# Patient Record
Sex: MALE | Race: WHITE | Employment: OTHER | URBAN - METROPOLITAN AREA
[De-identification: names, ages, dates, MRNs, and addresses within clinical notes are randomized per-mention and may not be internally consistent; named-entity substitution may affect disease eponyms.]

---

## 2017-02-07 ENCOUNTER — HOSPITAL ENCOUNTER (OUTPATIENT)
Dept: LAB | Age: 72
Discharge: HOME | End: 2017-02-07
Attending: INTERNAL MEDICINE
Payer: MEDICARE

## 2017-02-07 DIAGNOSIS — C73 PAPILLARY THYROID CARCINOMA (HCC): ICD-10-CM

## 2017-02-07 DIAGNOSIS — E89.0 POSTSURGICAL HYPOTHYROIDISM: ICD-10-CM

## 2017-02-07 LAB
T4 FREE SERPL-MCNC: 1.4 NG/DL (ref 0.78–1.46)
TSH SERPL DL<=0.005 MIU/L-ACNC: 1.97 UIU/ML (ref 0.36–3.74)

## 2017-02-07 PROCEDURE — 86800 THYROGLOBULIN ANTIBODY: CPT | Performed by: INTERNAL MEDICINE

## 2017-02-07 PROCEDURE — 84432 ASSAY OF THYROGLOBULIN: CPT | Performed by: INTERNAL MEDICINE

## 2017-02-07 PROCEDURE — 84439 ASSAY OF FREE THYROXINE: CPT | Performed by: INTERNAL MEDICINE

## 2017-02-07 PROCEDURE — 36415 COLL VENOUS BLD VENIPUNCTURE: CPT | Performed by: INTERNAL MEDICINE

## 2017-02-07 PROCEDURE — 84443 ASSAY THYROID STIM HORMONE: CPT | Performed by: INTERNAL MEDICINE

## 2017-02-08 LAB
THYROGLOB AB SERPL-ACNC: <1 IU/ML (ref 0–0.9)
THYROGLOBULIN, SERUM, 006694: 0.5 NG/ML (ref 1.4–29.2)

## 2017-08-08 ENCOUNTER — HOSPITAL ENCOUNTER (OUTPATIENT)
Dept: LAB | Age: 72
Discharge: HOME | End: 2017-08-08
Attending: INTERNAL MEDICINE
Payer: MEDICARE

## 2017-08-08 DIAGNOSIS — E89.0 POSTSURGICAL HYPOTHYROIDISM: ICD-10-CM

## 2017-08-08 DIAGNOSIS — E21.0 PRIMARY HYPERPARATHYROIDISM (HCC): ICD-10-CM

## 2017-08-08 LAB
ANION GAP BLD CALC-SCNC: 7 MMOL/L
BUN SERPL-MCNC: 20 MG/DL (ref 8–23)
CALCIUM SERPL-MCNC: 8.7 MG/DL (ref 8.3–10.4)
CHLORIDE SERPL-SCNC: 109 MMOL/L (ref 98–107)
CO2 SERPL-SCNC: 24 MMOL/L (ref 21–32)
CREAT SERPL-MCNC: 1.4 MG/DL (ref 0.8–1.5)
GLUCOSE SERPL-MCNC: 119 MG/DL (ref 65–100)
POTASSIUM SERPL-SCNC: 4.1 MMOL/L (ref 3.5–5.1)
SODIUM SERPL-SCNC: 140 MMOL/L (ref 136–145)
T4 FREE SERPL-MCNC: 1.4 NG/DL (ref 0.78–1.46)
TSH SERPL DL<=0.005 MIU/L-ACNC: 1.02 UIU/ML (ref 0.36–3.74)

## 2017-08-08 PROCEDURE — 36415 COLL VENOUS BLD VENIPUNCTURE: CPT | Performed by: INTERNAL MEDICINE

## 2017-08-08 PROCEDURE — 84439 ASSAY OF FREE THYROXINE: CPT | Performed by: INTERNAL MEDICINE

## 2017-08-08 PROCEDURE — 84443 ASSAY THYROID STIM HORMONE: CPT | Performed by: INTERNAL MEDICINE

## 2017-08-08 PROCEDURE — 80048 BASIC METABOLIC PNL TOTAL CA: CPT | Performed by: INTERNAL MEDICINE

## 2018-02-12 ENCOUNTER — HOSPITAL ENCOUNTER (OUTPATIENT)
Dept: LAB | Age: 73
Discharge: HOME | End: 2018-02-12
Payer: MEDICARE

## 2018-02-12 DIAGNOSIS — E89.0 POSTSURGICAL HYPOTHYROIDISM: ICD-10-CM

## 2018-02-12 DIAGNOSIS — E21.0 PRIMARY HYPERPARATHYROIDISM (HCC): ICD-10-CM

## 2018-02-12 DIAGNOSIS — C73 PAPILLARY THYROID CARCINOMA (HCC): ICD-10-CM

## 2018-02-12 LAB
ANION GAP SERPL CALC-SCNC: 8 MMOL/L (ref 7–16)
BUN SERPL-MCNC: 21 MG/DL (ref 8–23)
CALCIUM SERPL-MCNC: 8.7 MG/DL (ref 8.3–10.4)
CHLORIDE SERPL-SCNC: 107 MMOL/L (ref 98–107)
CO2 SERPL-SCNC: 28 MMOL/L (ref 21–32)
CREAT SERPL-MCNC: 1.5 MG/DL (ref 0.8–1.5)
GLUCOSE SERPL-MCNC: 88 MG/DL (ref 65–100)
POTASSIUM SERPL-SCNC: 4.9 MMOL/L (ref 3.5–5.1)
SODIUM SERPL-SCNC: 143 MMOL/L (ref 136–145)
T4 FREE SERPL-MCNC: 1.4 NG/DL (ref 0.78–1.46)
TSH SERPL DL<=0.005 MIU/L-ACNC: 0.94 UIU/ML (ref 0.36–3.74)

## 2018-02-12 PROCEDURE — 36415 COLL VENOUS BLD VENIPUNCTURE: CPT | Performed by: INTERNAL MEDICINE

## 2018-02-12 PROCEDURE — 86800 THYROGLOBULIN ANTIBODY: CPT | Performed by: INTERNAL MEDICINE

## 2018-02-12 PROCEDURE — 84432 ASSAY OF THYROGLOBULIN: CPT | Performed by: INTERNAL MEDICINE

## 2018-02-12 PROCEDURE — 80048 BASIC METABOLIC PNL TOTAL CA: CPT | Performed by: INTERNAL MEDICINE

## 2018-02-12 PROCEDURE — 84443 ASSAY THYROID STIM HORMONE: CPT | Performed by: INTERNAL MEDICINE

## 2018-02-12 PROCEDURE — 84439 ASSAY OF FREE THYROXINE: CPT | Performed by: INTERNAL MEDICINE

## 2018-02-13 LAB
THYROGLOB AB SERPL-ACNC: <1 IU/ML (ref 0–0.9)
THYROGLOBULIN, SERUM, 006694: 0.4 NG/ML (ref 1.4–29.2)

## 2018-08-08 ENCOUNTER — HOSPITAL ENCOUNTER (OUTPATIENT)
Dept: LAB | Age: 73
Discharge: HOME | End: 2018-08-08
Payer: MEDICARE

## 2018-08-08 DIAGNOSIS — E21.0 PRIMARY HYPERPARATHYROIDISM (HCC): ICD-10-CM

## 2018-08-08 DIAGNOSIS — E89.0 POSTSURGICAL HYPOTHYROIDISM: ICD-10-CM

## 2018-08-08 DIAGNOSIS — C73 PAPILLARY THYROID CARCINOMA (HCC): ICD-10-CM

## 2018-08-08 LAB
ANION GAP SERPL CALC-SCNC: 9 MMOL/L (ref 7–16)
BUN SERPL-MCNC: 22 MG/DL (ref 8–23)
CALCIUM SERPL-MCNC: 8.7 MG/DL (ref 8.3–10.4)
CHLORIDE SERPL-SCNC: 107 MMOL/L (ref 98–107)
CO2 SERPL-SCNC: 26 MMOL/L (ref 21–32)
CREAT SERPL-MCNC: 1.71 MG/DL (ref 0.8–1.5)
GLUCOSE SERPL-MCNC: 123 MG/DL (ref 65–100)
POTASSIUM SERPL-SCNC: 3.8 MMOL/L (ref 3.5–5.1)
SODIUM SERPL-SCNC: 142 MMOL/L (ref 136–145)
T4 FREE SERPL-MCNC: 1.3 NG/DL (ref 0.78–1.46)
TSH SERPL DL<=0.005 MIU/L-ACNC: 0.81 UIU/ML (ref 0.36–3.74)

## 2018-08-08 PROCEDURE — 80048 BASIC METABOLIC PNL TOTAL CA: CPT

## 2018-08-08 PROCEDURE — 84443 ASSAY THYROID STIM HORMONE: CPT

## 2018-08-08 PROCEDURE — 86800 THYROGLOBULIN ANTIBODY: CPT

## 2018-08-08 PROCEDURE — 84439 ASSAY OF FREE THYROXINE: CPT

## 2018-08-08 PROCEDURE — 84432 ASSAY OF THYROGLOBULIN: CPT

## 2018-08-08 PROCEDURE — 36415 COLL VENOUS BLD VENIPUNCTURE: CPT

## 2018-08-08 PROCEDURE — 82306 VITAMIN D 25 HYDROXY: CPT

## 2018-08-09 LAB
25(OH)D3+25(OH)D2 SERPL-MCNC: 39.2 NG/ML (ref 30–100)
THYROGLOB AB SERPL-ACNC: <1 IU/ML (ref 0–0.9)
THYROGLOBULIN, SERUM, 006694: 0.3 NG/ML (ref 1.4–29.2)

## 2019-08-08 ENCOUNTER — HOSPITAL ENCOUNTER (OUTPATIENT)
Dept: LAB | Age: 74
Discharge: HOME | End: 2019-08-08
Payer: MEDICARE

## 2019-08-08 DIAGNOSIS — C73 PAPILLARY THYROID CARCINOMA (HCC): ICD-10-CM

## 2019-08-08 DIAGNOSIS — N18.30 STAGE III CHRONIC KIDNEY DISEASE (HCC): ICD-10-CM

## 2019-08-08 DIAGNOSIS — E21.0 PRIMARY HYPERPARATHYROIDISM (HCC): ICD-10-CM

## 2019-08-08 DIAGNOSIS — E89.0 POSTSURGICAL HYPOTHYROIDISM: ICD-10-CM

## 2019-08-08 LAB
ANION GAP SERPL CALC-SCNC: 7 MMOL/L (ref 7–16)
BUN SERPL-MCNC: 24 MG/DL (ref 8–23)
CALCIUM SERPL-MCNC: 9.2 MG/DL (ref 8.3–10.4)
CHLORIDE SERPL-SCNC: 106 MMOL/L (ref 98–107)
CO2 SERPL-SCNC: 30 MMOL/L (ref 21–32)
CREAT SERPL-MCNC: 1.71 MG/DL (ref 0.8–1.5)
GLUCOSE SERPL-MCNC: 116 MG/DL (ref 65–100)
POTASSIUM SERPL-SCNC: 4.4 MMOL/L (ref 3.5–5.1)
SODIUM SERPL-SCNC: 143 MMOL/L (ref 136–145)
T4 FREE SERPL-MCNC: 1.2 NG/DL (ref 0.9–1.8)
TSH SERPL DL<=0.005 MIU/L-ACNC: 0.56 UIU/ML (ref 0.36–3.74)

## 2019-08-08 PROCEDURE — 84443 ASSAY THYROID STIM HORMONE: CPT

## 2019-08-08 PROCEDURE — 80048 BASIC METABOLIC PNL TOTAL CA: CPT

## 2019-08-08 PROCEDURE — 82306 VITAMIN D 25 HYDROXY: CPT

## 2019-08-08 PROCEDURE — 84439 ASSAY OF FREE THYROXINE: CPT

## 2019-08-08 PROCEDURE — 84432 ASSAY OF THYROGLOBULIN: CPT

## 2019-08-08 PROCEDURE — 36415 COLL VENOUS BLD VENIPUNCTURE: CPT

## 2019-08-08 PROCEDURE — 86800 THYROGLOBULIN ANTIBODY: CPT

## 2019-08-09 LAB
25(OH)D3+25(OH)D2 SERPL-MCNC: 45.2 NG/ML (ref 30–100)
THYROGLOB AB SERPL-ACNC: <1 IU/ML (ref 0–0.9)
THYROGLOBULIN, SERUM, 006694: 0.2 NG/ML (ref 1.4–29.2)

## 2020-08-17 ENCOUNTER — HOSPITAL ENCOUNTER (OUTPATIENT)
Dept: LAB | Age: 75
Discharge: HOME | End: 2020-08-17
Payer: MEDICARE

## 2020-08-17 LAB
T4 FREE SERPL-MCNC: 1.5 NG/DL (ref 0.78–1.46)
TSH SERPL DL<=0.005 MIU/L-ACNC: 0.72 UIU/ML (ref 0.36–3.74)

## 2020-08-17 PROCEDURE — 36415 COLL VENOUS BLD VENIPUNCTURE: CPT

## 2020-08-17 PROCEDURE — 86800 THYROGLOBULIN ANTIBODY: CPT

## 2020-08-17 PROCEDURE — 84432 ASSAY OF THYROGLOBULIN: CPT

## 2020-08-17 PROCEDURE — 84439 ASSAY OF FREE THYROXINE: CPT

## 2020-08-17 PROCEDURE — 84443 ASSAY THYROID STIM HORMONE: CPT

## 2020-08-18 LAB
THYROGLOB AB SERPL-ACNC: <1 IU/ML (ref 0–0.9)
THYROGLOBULIN, SERUM, 006694: 0.3 NG/ML (ref 1.4–29.2)

## 2021-08-19 ENCOUNTER — HOSPITAL ENCOUNTER (OUTPATIENT)
Dept: LAB | Age: 76
Discharge: HOME | End: 2021-08-19
Payer: MEDICARE

## 2021-08-19 DIAGNOSIS — E89.0 POSTSURGICAL HYPOTHYROIDISM: ICD-10-CM

## 2021-08-19 DIAGNOSIS — E21.0 PRIMARY HYPERPARATHYROIDISM (HCC): ICD-10-CM

## 2021-08-19 DIAGNOSIS — R97.20 ELEVATED PSA: ICD-10-CM

## 2021-08-19 DIAGNOSIS — C73 PAPILLARY THYROID CARCINOMA (HCC): ICD-10-CM

## 2021-08-19 LAB
ALBUMIN SERPL-MCNC: 3.7 G/DL (ref 3.2–4.6)
ALBUMIN/GLOB SERPL: 1.2 {RATIO} (ref 1.2–3.5)
ALP SERPL-CCNC: 75 U/L (ref 50–136)
ALT SERPL-CCNC: 34 U/L (ref 12–65)
ANION GAP SERPL CALC-SCNC: 5 MMOL/L (ref 7–16)
AST SERPL-CCNC: 28 U/L (ref 15–37)
BILIRUB SERPL-MCNC: 0.4 MG/DL (ref 0.2–1.1)
BUN SERPL-MCNC: 25 MG/DL (ref 8–23)
CALCIUM SERPL-MCNC: 9.1 MG/DL (ref 8.3–10.4)
CHLORIDE SERPL-SCNC: 109 MMOL/L (ref 98–107)
CO2 SERPL-SCNC: 28 MMOL/L (ref 21–32)
CREAT SERPL-MCNC: 1.71 MG/DL (ref 0.8–1.5)
GLOBULIN SER CALC-MCNC: 3.2 G/DL (ref 2.3–3.5)
GLUCOSE SERPL-MCNC: 60 MG/DL (ref 65–100)
POTASSIUM SERPL-SCNC: 4.6 MMOL/L (ref 3.5–5.1)
PROT SERPL-MCNC: 6.9 G/DL (ref 6.3–8.2)
PSA SERPL-MCNC: 5.6 NG/ML
SODIUM SERPL-SCNC: 142 MMOL/L (ref 138–145)
T4 FREE SERPL-MCNC: 1.3 NG/DL (ref 0.78–1.46)
TSH SERPL DL<=0.005 MIU/L-ACNC: 0.47 UIU/ML (ref 0.36–3.74)

## 2021-08-19 PROCEDURE — 86800 THYROGLOBULIN ANTIBODY: CPT

## 2021-08-19 PROCEDURE — 84443 ASSAY THYROID STIM HORMONE: CPT

## 2021-08-19 PROCEDURE — 36415 COLL VENOUS BLD VENIPUNCTURE: CPT

## 2021-08-19 PROCEDURE — 84439 ASSAY OF FREE THYROXINE: CPT

## 2021-08-19 PROCEDURE — 80053 COMPREHEN METABOLIC PANEL: CPT

## 2021-08-19 PROCEDURE — 84153 ASSAY OF PSA TOTAL: CPT

## 2021-08-19 PROCEDURE — 84432 ASSAY OF THYROGLOBULIN: CPT

## 2022-05-27 DIAGNOSIS — C73 PAPILLARY THYROID CARCINOMA (HCC): ICD-10-CM

## 2022-05-27 DIAGNOSIS — E89.0 POSTSURGICAL HYPOTHYROIDISM: Primary | ICD-10-CM

## 2022-05-27 DIAGNOSIS — E21.0 PRIMARY HYPERPARATHYROIDISM (HCC): ICD-10-CM

## 2022-07-11 DIAGNOSIS — R97.20 ELEVATED PSA: Primary | ICD-10-CM

## 2022-07-13 ENCOUNTER — NURSE ONLY (OUTPATIENT)
Dept: UROLOGY | Age: 77
End: 2022-07-13

## 2022-07-13 DIAGNOSIS — R97.20 ELEVATED PSA: ICD-10-CM

## 2022-07-13 LAB — PSA SERPL-MCNC: 6.3 NG/ML

## 2022-07-20 ENCOUNTER — OFFICE VISIT (OUTPATIENT)
Dept: UROLOGY | Age: 77
End: 2022-07-20
Payer: MEDICARE

## 2022-07-20 DIAGNOSIS — R97.20 ELEVATED PSA: Primary | ICD-10-CM

## 2022-07-20 LAB
BILIRUBIN, URINE, POC: NEGATIVE
BLOOD URINE, POC: NEGATIVE
GLUCOSE URINE, POC: NEGATIVE
KETONES, URINE, POC: NEGATIVE
LEUKOCYTE ESTERASE, URINE, POC: NORMAL
NITRITE, URINE, POC: NEGATIVE
PH, URINE, POC: 5.5 (ref 4.6–8)
PROTEIN,URINE, POC: NEGATIVE
SPECIFIC GRAVITY, URINE, POC: 1.02 (ref 1–1.03)
URINALYSIS CLARITY, POC: NORMAL
URINALYSIS COLOR, POC: NORMAL
UROBILINOGEN, POC: NORMAL

## 2022-07-20 PROCEDURE — 1036F TOBACCO NON-USER: CPT | Performed by: UROLOGY

## 2022-07-20 PROCEDURE — 99213 OFFICE O/P EST LOW 20 MIN: CPT | Performed by: UROLOGY

## 2022-07-20 PROCEDURE — 1123F ACP DISCUSS/DSCN MKR DOCD: CPT | Performed by: UROLOGY

## 2022-07-20 PROCEDURE — G8420 CALC BMI NORM PARAMETERS: HCPCS | Performed by: UROLOGY

## 2022-07-20 PROCEDURE — G8427 DOCREV CUR MEDS BY ELIG CLIN: HCPCS | Performed by: UROLOGY

## 2022-07-20 PROCEDURE — 81003 URINALYSIS AUTO W/O SCOPE: CPT | Performed by: UROLOGY

## 2022-07-20 RX ORDER — AMIODARONE HYDROCHLORIDE 200 MG/1
100 TABLET ORAL DAILY
COMMUNITY
Start: 2021-12-07

## 2022-07-20 RX ORDER — HYDROXYZINE HYDROCHLORIDE 10 MG/1
TABLET, FILM COATED ORAL
COMMUNITY
Start: 2022-06-24

## 2022-07-20 NOTE — PROGRESS NOTES
[facility]  [place], [address] [facility]  [phone]    Jonathan Moyer  : 1945     HPI   68 y.o., male returns in follow up for an elevated PSA. PSA was 5.8 (29% free) on 2017; 5.0 on 2017; 5.5 on 10/20/18; 4.9 (35% free) on 19; 5.4 on 19; 5.2 on ; 6.0 on 6/15/21 and is now 6.3 on 22. Previously seen by Dr. Amanda Oconnell. No prior bx or FH of CaP. Taking eliquis for a fib. Reports stable nocturia times one. Past Medical History:   Diagnosis Date    Atrial fibrillation ([address]     COVID-19 2021    Hyperlipidemia     Papillary thyroid carcinoma (HCC)     1.2 cm and 0.25 cm x 2 in the left lobe status post thyroidectomy without lymph node dissection 3/25/2015 (no RAJAN)    Postsurgical hypothyroidism     Primary hyperparathyroidism ([address]     Status post parathyroidectomy 3/27/2015    Stage III chronic kidney disease [facility])      Past Surgical History:   Procedure Laterality Date    ADENOIDECTOMY  Age 8    HEENT  2004    Mouth surgery    HERNIA REPAIR  1998    ORTHOPEDIC SURGERY  2018    right hand Duputren's contracture repair    PARATHYROIDECTOMY  2015    Dr. Baljit Thornton Bilateral 2015    Dr. Frankie Sherwood  Age 4     Current Outpatient Medications   Medication Sig Dispense Refill    amiodarone (CORDARONE) 200 MG tablet 200 mg      hydrOXYzine HCl (ATARAX) 10 MG tablet TAKE ONE TABLET BY MOUTH THREE TIMES DAILY AS NEEDED ITCHING      apixaban (ELIQUIS) 5 MG TABS tablet Take 5 mg by mouth 2 times daily      levothyroxine (SYNTHROID) 125 MCG tablet Take 125 mcg by mouth every morning (before breakfast)      lipase-protease-amylase (CREON) 6000-96305 units delayed release capsule Take by mouth 3 times daily (with meals)      simvastatin (ZOCOR) 10 MG tablet Take 10 mg by mouth       No current facility-administered medications for this visit.      Not on File  Social History     Socioeconomic History    Marital status:      Spouse name: Not on file    Number of children: Not on file    Years of education: Not on file    Highest education level: Not on file   Occupational History    Not on file   Tobacco Use    Smoking status: Never    Smokeless tobacco: Never   Substance and Sexual Activity    Alcohol use: No    Drug use: Not on file    Sexual activity: Not on file   Other Topics Concern    Not on file   Social History Narrative    Not on file     Social Determinants of Health     Financial Resource Strain: Not on file   Food Insecurity: Not on file   Transportation Needs: Not on file   Physical Activity: Not on file   Stress: Not on file   Social Connections: Not on file   Intimate Partner Violence: Not on file   Housing Stability: Not on file     Family History   Problem Relation Age of Onset    Heart Disease Mother     Heart Disease Father     Hypertension Father     Thyroid Cancer Neg Hx     Breast Cancer Maternal Aunt        Review of Systems  All systems reviewed and are negative at this time. Physical Exam  There were no vitals taken for this visit.   General appearance - alert, well appearing, and in no distress  Mental status - alert, oriented to person, place, and time  Eyes - extraocular eye movements intact, sclera anicteric  MARYAM- anodular prostate  Neurological -  normal speech, no focal findings or movement disorder noted  Skin - normal coloration and turgor        Urinalysis  UA - Dipstick  Results for orders placed or performed in visit on 07/20/22   AMB POC URINALYSIS DIP STICK AUTO W/O MICRO   Result Value Ref Range    Color (UA POC)      Clarity (UA POC)      Glucose, Urine, POC Negative Negative    Bilirubin, Urine, POC Negative Negative    KETONES, Urine, POC Negative Negative    Specific Gravity, Urine, POC 1.020 1.001 - 1.035    Blood (UA POC) Negative Negative    pH, Urine, POC 5.5 4.6 - 8.0    Protein, Urine, POC Negative Negative    Urobilinogen, POC 0.2 mg/dL     Nitrite, Urine, POC Negative Negative    Leukocyte Esterase, Urine, POC Trace Negative       UA - Micro  WBC - 0  RBC - 0  Bacteria - 0  Epith - 0    Assessment/Plan    ICD-10-CM    1. Elevated PSA  R97.20 AMB POC URINALYSIS DIP STICK AUTO W/O MICRO     PSA, Diagnostic        Pt elected for cont observation. RTO in 12 mo with PSA prior.     WILLI TOUSSAINT, DO

## 2022-08-16 DIAGNOSIS — E89.0 POSTSURGICAL HYPOTHYROIDISM: ICD-10-CM

## 2022-08-16 DIAGNOSIS — C73 PAPILLARY THYROID CARCINOMA (HCC): ICD-10-CM

## 2022-08-16 DIAGNOSIS — E21.0 PRIMARY HYPERPARATHYROIDISM (HCC): ICD-10-CM

## 2022-08-16 LAB
ALBUMIN SERPL-MCNC: 3.7 G/DL (ref 3.2–4.6)
ALBUMIN/GLOB SERPL: 1.2 {RATIO} (ref 1.2–3.5)
ALP SERPL-CCNC: 70 U/L (ref 50–136)
ALT SERPL-CCNC: 27 U/L (ref 12–65)
ANION GAP SERPL CALC-SCNC: 2 MMOL/L (ref 7–16)
AST SERPL-CCNC: 22 U/L (ref 15–37)
BILIRUB SERPL-MCNC: 0.6 MG/DL (ref 0.2–1.1)
BUN SERPL-MCNC: 24 MG/DL (ref 8–23)
CALCIUM SERPL-MCNC: 8.7 MG/DL (ref 8.3–10.4)
CHLORIDE SERPL-SCNC: 108 MMOL/L (ref 98–107)
CO2 SERPL-SCNC: 28 MMOL/L (ref 21–32)
CREAT SERPL-MCNC: 1.8 MG/DL (ref 0.8–1.5)
GLOBULIN SER CALC-MCNC: 3 G/DL (ref 2.3–3.5)
GLUCOSE SERPL-MCNC: 85 MG/DL (ref 65–100)
POTASSIUM SERPL-SCNC: 5.4 MMOL/L (ref 3.5–5.1)
PROT SERPL-MCNC: 6.7 G/DL (ref 6.3–8.2)
SODIUM SERPL-SCNC: 138 MMOL/L (ref 138–145)
T4 FREE SERPL-MCNC: 1.7 NG/DL (ref 0.78–1.46)
TSH, 3RD GENERATION: 0.13 UIU/ML (ref 0.36–3.74)

## 2022-08-17 LAB
THYROGLOB AB SERPL-ACNC: <1 IU/ML (ref 0–0.9)
THYROGLOBULIN: 0.3 NG/ML (ref 1.4–29.2)

## 2022-08-23 ENCOUNTER — OFFICE VISIT (OUTPATIENT)
Dept: ENDOCRINOLOGY | Age: 77
End: 2022-08-23
Payer: MEDICARE

## 2022-08-23 VITALS — BODY MASS INDEX: 20.5 KG/M2 | WEIGHT: 147 LBS | SYSTOLIC BLOOD PRESSURE: 160 MMHG | DIASTOLIC BLOOD PRESSURE: 96 MMHG

## 2022-08-23 DIAGNOSIS — I48.0 PAROXYSMAL ATRIAL FIBRILLATION (HCC): ICD-10-CM

## 2022-08-23 DIAGNOSIS — C73 PAPILLARY THYROID CARCINOMA (HCC): Primary | ICD-10-CM

## 2022-08-23 DIAGNOSIS — E89.0 POSTSURGICAL HYPOTHYROIDISM: ICD-10-CM

## 2022-08-23 DIAGNOSIS — Z86.39 HISTORY OF PRIMARY HYPERPARATHYROIDISM: ICD-10-CM

## 2022-08-23 PROCEDURE — 1036F TOBACCO NON-USER: CPT | Performed by: INTERNAL MEDICINE

## 2022-08-23 PROCEDURE — G8420 CALC BMI NORM PARAMETERS: HCPCS | Performed by: INTERNAL MEDICINE

## 2022-08-23 PROCEDURE — G8428 CUR MEDS NOT DOCUMENT: HCPCS | Performed by: INTERNAL MEDICINE

## 2022-08-23 PROCEDURE — 99214 OFFICE O/P EST MOD 30 MIN: CPT | Performed by: INTERNAL MEDICINE

## 2022-08-23 PROCEDURE — 1123F ACP DISCUSS/DSCN MKR DOCD: CPT | Performed by: INTERNAL MEDICINE

## 2022-08-23 RX ORDER — ZINC SULFATE 50(220)MG
50 CAPSULE ORAL DAILY
COMMUNITY

## 2022-08-23 RX ORDER — LEVOTHYROXINE SODIUM 112 UG/1
112 TABLET ORAL
Qty: 90 TABLET | Refills: 3 | Status: SHIPPED | OUTPATIENT
Start: 2022-08-23

## 2022-08-23 ASSESSMENT — ENCOUNTER SYMPTOMS
CONSTIPATION: 0
DIARRHEA: 0

## 2022-08-23 NOTE — PROGRESS NOTES
Liza Oconnor MD, [address]            Reason for visit: Follow-up of thyroid cancer, hypothyroidism, and hyperparathyroidism      ASSESSMENT AND PLAN:    1. Papillary thyroid carcinoma ([facility]  He is status post thyroidectomy 3/27/2015 for a multifocal (largest tumor 1.2 cm) papillary carcinoma. He has not received radioactive iodine, in order I recommended. All testing has been suggestive of cure. Going forward, he can be monitored with yearly thyroglobulin and ultrasound every few years (most recent ultrasound was 2020). - Thyroglobulin Panel; Future    2. Postsurgical hypothyroidism  Because of comorbid paroxysmal atrial fibrillation, I recommend against suppression of TSH. He is currently overtreated. I will reduce his levothyroxine dose slightly as below. He will recheck thyroid function in 2 months and then again prior to the next appointment. - levothyroxine (SYNTHROID) 112 MCG tablet; Take 1 tablet by mouth every morning (before breakfast)  Dispense: 90 tablet; Refill: 3  - TSH; Future  - T4, Free; Future  - TSH; Future  - T4, Free; Future    3. History of primary hyperparathyroidism  Calcium remains normal following parathyroidectomy 3/27/2015. 4. Paroxysmal atrial fibrillation ([facility]      Follow-up and Dispositions    Return in 1 year (on 8/23/2023). History of Present Illness:    HYPOTHYROIDISM  Demetrius Bansal presents to the office for follow up of papillary thyroid carcinoma and postsurgical hypothyroidism. Presentation of hypothyroidism: He underwent thyroidectomy at the time of parathyroidectomy with Dr. Nelly Patton 3/27/2015. Treatment status: He was started on Synthroid 100 mcg daily 3/27/2015. His dose was increased to 112 mcg daily 4/27/2015 and to 125 mcg daily 2/15/2016.     Thyroid labs:  1/16/2015: TSH 1.200, free T4 1.01.   4/24/2015: TSH 4.689, free T4 1.2.  8/19/2015: TSH 3.336.  2/8/2016: TSH 4.54, free T4 1.3.  3/28/2016: TSH 2.761, free T4 1.38.    2017: TSH 1.970, free T4 1.4.  2017: TSH 1.020, free T4 1.4.  2018: TSH 0.943, free T4 1.4.  2018: TSH 0.806, free T4 1.3.  2019: TSH 0.561, free T4 1.2.  2020: TSH 0.719, free T4 1.5.  2021: TSH 0.473, free T4 1.3.  10/23/2021: TSH 0.442.  10/29/2021: TSH 2.200, free T4 1.29.  2021: TSH 2.043, free T4 1.62.  2022: TSH 0.312, free T4 1.59.  2022: TSH 0.130, free T4 1.7. Symptoms: See review of systems below       THYROID CANCER   Prior treatment:   3/27/2015: Thyroidectomy without lymph node dissection ([facility])- 1.2 cm papillary thyroid carcinoma (classic architecture), 0.25 cm papillary thyroid carcinoma (classic architecture) x 2 in the left lobe. No RAJAN. Labs:  2015: Thyroglobulin 1.11, thyroglobulin antibodies less than 1.  2015: Thyroglobulin 0.68, thyroglobulin antibodies less than 1.  3:Thyroglobulin 0.5, thyroglobulin antibodies less than 1.    2017: Thyroglobulin 0.5, thyroglobulin antibodies less than 1.0.  2018: Thyroglobulin 0.4, thyroglobulin antibodies less than 1.0.  2018: Thyroglobulin 0.3, thyroglobulin antibodies less than 1.0.  2019: Thyroglobulin 0.2, thyroglobulin antibodies less than 1.0.  2020: Thyroglobulin 0.3, thyroglobulin antibodies less than 1.0.  2021: Thyroglobulin 0.3, thyroglobulin antibodies less than 1.0.  2022: Thyroglobulin 0.3, thyroglobulin antibodies less than 1.0. Imagin/15/2016: Head and neck ultrasound ([facility])- no evidence for local recurrence. 2020: Head and neck ultrasound ([facility])- no evidence for local recurrence. HYPERCALCEMIA  Presentation of hypercalcemia: Hypercalcemia was incidentally discovered ~. Subsequent evaluation revealed the presence of primary hyperparathyroidism.      Labs:   2015: Calcium 10.6, ionized calcium 5.7 (reference 4.8-5.6), creatinine 1.4, intact parathyroid hormone 203.1, 25 hydroxy vitamin D 46.4.   4/24/2015: Calcium 9.8, creatinine 1.48.  8/19/2015: Calcium 9.7, creatinine 1.63.  3/28/2016: Calcium 8.9, creatinine 1.41.  7/11/2016: Ionized calcium 4.6.  8/8/2017: Calcium 8.7, creatinine 1.40.  2/12/2018: Calcium 8.7, creatinine 1.50.  8/8/2018: Calcium 8.7, creatinine 1.71, 25 hydroxy vitamin D 39.2.  8/8/2019: Calcium 9.2, creatinine 1.71, 25-hydroxy vitamin D 45.2.  8/19/2021: Calcium 9.1, creatinine 1.71.  8/16/2022: Calcium 8.7, creatinine 1.80. Prior treatment: Parathyroidectomy 3/27/2015. Review of Systems   Constitutional:  Positive for unexpected weight change (lost 25 pounds in the last 2+ years (8 pounds in the last year)). Negative for fatigue. Cardiovascular:  Negative for palpitations. Gastrointestinal:  Negative for constipation and diarrhea. Psychiatric/Behavioral:  Negative for sleep disturbance. BP (!) 160/96 (Site: Left Upper Arm, Position: Sitting)   Wt 147 lb (66.7 kg)   BMI 20.50 kg/m²   Wt Readings from Last 3 Encounters:   08/23/22 147 lb (66.7 kg)   08/23/21 155 lb (70.3 kg)       Physical Exam  Constitutional:       Appearance: Normal appearance. HENT:      Head: Normocephalic. Neck:      Thyroid: No thyroid mass or thyromegaly. Comments: Healed thyroidectomy scar. No palpable neck masses. Cardiovascular:      Rate and Rhythm: Normal rate and regular rhythm. Pulmonary:      Effort: Pulmonary effort is normal.      Breath sounds: Normal breath sounds. Neurological:      Mental Status: He is alert.    Psychiatric:         Mood and Affect: Mood normal.         Behavior: Behavior normal.       Orders Placed This Encounter   Procedures    TSH     Standing Status:   Future     Standing Expiration Date:   8/23/2024    T4, Free     Standing Status:   Future     Standing Expiration Date:   8/23/2024    TSH     Standing Status:   Future     Standing Expiration Date:   8/23/2024    T4, Free     Standing Status: Future     Standing Expiration Date:   8/23/2024    Thyroglobulin Panel     Standing Status:   Future     Standing Expiration Date:   8/23/2024         Current Outpatient Medications   Medication Sig Dispense Refill    zinc sulfate (ZINCATE) 220 (50 Zn) MG capsule Take 50 mg by mouth daily      levothyroxine (SYNTHROID) 112 MCG tablet Take 1 tablet by mouth every morning (before breakfast) 90 tablet 3    amiodarone (CORDARONE) 200 MG tablet Take 100 mg by mouth daily      hydrOXYzine HCl (ATARAX) 10 MG tablet TAKE ONE TABLET BY MOUTH THREE TIMES DAILY AS NEEDED ITCHING      apixaban (ELIQUIS) 5 MG TABS tablet Take 5 mg by mouth 2 times daily      lipase-protease-amylase (CREON) 6000-36932 units delayed release capsule Take by mouth 3 times daily (with meals)      simvastatin (ZOCOR) 10 MG tablet Take 10 mg by mouth       No current facility-administered medications for this visit. Radha Haro MD, FACE      Portions of this note were generated with the assistance of voice recognition software. As such, some errors in transcription may be present.

## 2022-09-02 DIAGNOSIS — E89.0 POSTPROCEDURAL HYPOTHYROIDISM: ICD-10-CM

## 2022-09-02 RX ORDER — LEVOTHYROXINE SODIUM 0.12 MG/1
TABLET ORAL
Qty: 90 TABLET | Refills: 3 | OUTPATIENT
Start: 2022-09-02

## 2022-10-21 DIAGNOSIS — E89.0 POSTSURGICAL HYPOTHYROIDISM: ICD-10-CM

## 2022-10-22 LAB
T4 FREE SERPL-MCNC: 1.7 NG/DL (ref 0.78–1.46)
TSH, 3RD GENERATION: 1.64 UIU/ML (ref 0.36–3.74)

## 2023-07-13 ENCOUNTER — NURSE ONLY (OUTPATIENT)
Dept: UROLOGY | Age: 78
End: 2023-07-13

## 2023-07-13 DIAGNOSIS — R97.20 ELEVATED PSA: ICD-10-CM

## 2023-07-13 LAB — PSA SERPL-MCNC: 6.1 NG/ML

## 2023-07-20 ENCOUNTER — OFFICE VISIT (OUTPATIENT)
Dept: UROLOGY | Age: 78
End: 2023-07-20
Payer: MEDICARE

## 2023-07-20 DIAGNOSIS — N40.1 BENIGN PROSTATIC HYPERPLASIA WITH LOWER URINARY TRACT SYMPTOMS, SYMPTOM DETAILS UNSPECIFIED: ICD-10-CM

## 2023-07-20 DIAGNOSIS — R97.20 ELEVATED PSA: Primary | ICD-10-CM

## 2023-07-20 PROCEDURE — 99214 OFFICE O/P EST MOD 30 MIN: CPT | Performed by: UROLOGY

## 2023-07-20 PROCEDURE — 1123F ACP DISCUSS/DSCN MKR DOCD: CPT | Performed by: UROLOGY

## 2023-07-20 NOTE — PROGRESS NOTES
[facility]  [address] [address]  [phone]    Pillo Estrella  : 1945     HPI   66 y.o., male returns in follow up for an elevated PSA. PSA was 5.8 (29% free) on 2017; 5.0 on 2017; 5.5 on 10/20/18; 4.9 (35% free) on 19; 5.4 on 19; 5.2 on ; 6.0 on 6/15/21; 6.3 on 22 and is now 6.1 on 23. Previously seen by Dr. Ivy Segundo. No prior bx or FH of CaP. Taking eliquis for a fib. Reports stable nocturia times one.       Past Medical History:   Diagnosis Date    COVID-19 2021    Hyperlipidemia     Papillary thyroid carcinoma (HCC)     1.2 cm and 0.25 cm x 2 in the left lobe status post thyroidectomy without lymph node dissection 3/25/2015 (no RAJAN)    Paroxysmal atrial fibrillation (HCC)     Postsurgical hypothyroidism     Primary hyperparathyroidism ([address])     Status post parathyroidectomy 3/27/2015    Stage III chronic kidney disease [facility])      Past Surgical History:   Procedure Laterality Date    ADENOIDECTOMY  Age 8    HEENT  2004    Mouth surgery    HERNIA REPAIR  1998    ORTHOPEDIC SURGERY  2018    right hand Duputren's contracture repair    PARATHYROIDECTOMY  2015    Dr. Rice Hanks Bilateral 2015    Dr. Jovany Orta  Age 4     Current Outpatient Medications   Medication Sig Dispense Refill    zinc sulfate (ZINCATE) 220 (50 Zn) MG capsule Take 50 mg by mouth daily      levothyroxine (SYNTHROID) 112 MCG tablet Take 1 tablet by mouth every morning (before breakfast) 90 tablet 3    amiodarone (CORDARONE) 200 MG tablet Take 100 mg by mouth daily      hydrOXYzine HCl (ATARAX) 10 MG tablet TAKE ONE TABLET BY MOUTH THREE TIMES DAILY AS NEEDED ITCHING      apixaban (ELIQUIS) 5 MG TABS tablet Take 5 mg by mouth 2 times daily      lipase-protease-amylase (CREON) 6000-08773 units delayed release capsule Take by mouth 3 times daily (with meals)      simvastatin (ZOCOR) 10 MG tablet Take 10 mg by mouth       No

## 2024-07-18 ENCOUNTER — LAB (OUTPATIENT)
Dept: UROLOGY | Age: 79
End: 2024-07-18

## 2024-07-18 DIAGNOSIS — R97.20 ELEVATED PSA: ICD-10-CM

## 2024-07-18 LAB — PSA SERPL-MCNC: 6.5 NG/ML (ref 0–4)

## 2024-07-22 ENCOUNTER — OFFICE VISIT (OUTPATIENT)
Dept: UROLOGY | Age: 79
End: 2024-07-22
Payer: MEDICARE

## 2024-07-22 DIAGNOSIS — R97.20 ELEVATED PSA: Primary | ICD-10-CM

## 2024-07-22 DIAGNOSIS — N40.1 BENIGN PROSTATIC HYPERPLASIA WITH LOWER URINARY TRACT SYMPTOMS, SYMPTOM DETAILS UNSPECIFIED: ICD-10-CM

## 2024-07-22 LAB
BILIRUBIN, URINE, POC: NEGATIVE
BLOOD URINE, POC: NEGATIVE
GLUCOSE URINE, POC: NEGATIVE
KETONES, URINE, POC: NEGATIVE
LEUKOCYTE ESTERASE, URINE, POC: NORMAL
NITRITE, URINE, POC: NEGATIVE
PH, URINE, POC: 6 (ref 4.6–8)
PROTEIN,URINE, POC: NEGATIVE
SPECIFIC GRAVITY, URINE, POC: 1.01 (ref 1–1.03)
URINALYSIS CLARITY, POC: NORMAL
URINALYSIS COLOR, POC: NORMAL
UROBILINOGEN, POC: NORMAL

## 2024-07-22 PROCEDURE — 1123F ACP DISCUSS/DSCN MKR DOCD: CPT | Performed by: UROLOGY

## 2024-07-22 PROCEDURE — 81003 URINALYSIS AUTO W/O SCOPE: CPT | Performed by: UROLOGY

## 2024-07-22 PROCEDURE — 99213 OFFICE O/P EST LOW 20 MIN: CPT | Performed by: UROLOGY

## 2024-07-22 PROCEDURE — G8427 DOCREV CUR MEDS BY ELIG CLIN: HCPCS | Performed by: UROLOGY

## 2024-07-22 PROCEDURE — G8421 BMI NOT CALCULATED: HCPCS | Performed by: UROLOGY

## 2024-07-22 PROCEDURE — 1036F TOBACCO NON-USER: CPT | Performed by: UROLOGY

## 2024-07-22 NOTE — PROGRESS NOTES
[facility]  [address] [place] [address]  [phone]    Adam Cadena  : 1945     HPI   79 y.o., male returns in follow up for an elevated PSA.  PSA was 5.8 (29% free) on 2017; 5.0 on 2017; 5.5 on 10/20/18; 4.9 (35% free) on 19; 5.4 on 19; 5.2 on ; 6.0 on 6/15/21; 6.3 on 22; 6.1 on 23 and is now 6.5 on 24.  Previously seen by Dr. Wheat.  No prior bx or FH of CaP.  Taking eliquis for a fib.  Reports stable nocturia 1-2x per night..       Past Medical History:   Diagnosis Date    COVID-2021    Hyperlipidemia     Papillary thyroid carcinoma (HCC)     1.2 cm and 0.25 cm x 2 in the left lobe status post thyroidectomy without lymph node dissection 3/25/2015 (no RAJAN)    Paroxysmal atrial fibrillation (HCC)     Postsurgical hypothyroidism     Primary hyperparathyroidism (HCC)     Status post parathyroidectomy 3/27/2015    Stage III chronic kidney disease (HCC)      Past Surgical History:   Procedure Laterality Date    ADENOIDECTOMY  Age 8    HEENT  2004    Mouth surgery    HERNIA REPAIR  1998    ORTHOPEDIC SURGERY  2018    right hand Duputren's contracture repair    PARATHYROIDECTOMY  2015    Dr. Michelle    THYROIDECTOMY Bilateral 2015    Dr. Michelle    TONSILLECTOMY  Age 4     Current Outpatient Medications   Medication Sig Dispense Refill    zinc sulfate (ZINCATE) 220 (50 Zn) MG capsule Take 1 capsule by mouth daily      levothyroxine (SYNTHROID) 112 MCG tablet Take 1 tablet by mouth every morning (before breakfast) 90 tablet 3    amiodarone (CORDARONE) 200 MG tablet Take 0.5 tablets by mouth daily      hydrOXYzine HCl (ATARAX) 10 MG tablet TAKE ONE TABLET BY MOUTH THREE TIMES DAILY AS NEEDED ITCHING      apixaban (ELIQUIS) 5 MG TABS tablet Take 1 tablet by mouth 2 times daily      lipase-protease-amylase (CREON) 6000-31003 units delayed release capsule Take by mouth 3 times daily (with meals)      simvastatin (ZOCOR) 10 MG

## 2025-07-15 ENCOUNTER — LAB (OUTPATIENT)
Dept: UROLOGY | Age: 80
End: 2025-07-15

## 2025-07-15 DIAGNOSIS — R97.20 ELEVATED PSA: ICD-10-CM

## 2025-07-15 LAB — PSA SERPL-MCNC: 7.3 NG/ML (ref 0–4)

## 2025-07-21 ENCOUNTER — OFFICE VISIT (OUTPATIENT)
Dept: UROLOGY | Age: 80
End: 2025-07-21
Payer: MEDICARE

## 2025-07-21 DIAGNOSIS — R97.20 ELEVATED PSA: Primary | ICD-10-CM

## 2025-07-21 DIAGNOSIS — N40.1 BENIGN PROSTATIC HYPERPLASIA WITH LOWER URINARY TRACT SYMPTOMS, SYMPTOM DETAILS UNSPECIFIED: ICD-10-CM

## 2025-07-21 LAB
BILIRUBIN, URINE, POC: NEGATIVE
BLOOD URINE, POC: NEGATIVE
GLUCOSE URINE, POC: NEGATIVE MG/DL
KETONES, URINE, POC: NEGATIVE MG/DL
LEUKOCYTE ESTERASE, URINE, POC: NORMAL
NITRITE, URINE, POC: NEGATIVE
PH, URINE, POC: 6 (ref 4.6–8)
PROTEIN,URINE, POC: NEGATIVE MG/DL
SPECIFIC GRAVITY, URINE, POC: 1.02 (ref 1–1.03)
URINALYSIS CLARITY, POC: NORMAL
URINALYSIS COLOR, POC: NORMAL
UROBILINOGEN, POC: NORMAL MG/DL

## 2025-07-21 PROCEDURE — 81003 URINALYSIS AUTO W/O SCOPE: CPT | Performed by: UROLOGY

## 2025-07-21 PROCEDURE — G8427 DOCREV CUR MEDS BY ELIG CLIN: HCPCS | Performed by: UROLOGY

## 2025-07-21 PROCEDURE — 99214 OFFICE O/P EST MOD 30 MIN: CPT | Performed by: UROLOGY

## 2025-07-21 PROCEDURE — 1123F ACP DISCUSS/DSCN MKR DOCD: CPT | Performed by: UROLOGY

## 2025-07-21 PROCEDURE — G8421 BMI NOT CALCULATED: HCPCS | Performed by: UROLOGY

## 2025-07-21 PROCEDURE — 1159F MED LIST DOCD IN RCRD: CPT | Performed by: UROLOGY

## 2025-07-21 PROCEDURE — 1160F RVW MEDS BY RX/DR IN RCRD: CPT | Performed by: UROLOGY

## 2025-07-21 PROCEDURE — 1036F TOBACCO NON-USER: CPT | Performed by: UROLOGY

## 2025-07-21 RX ORDER — TAMSULOSIN HYDROCHLORIDE 0.4 MG/1
0.4 CAPSULE ORAL EVERY EVENING
Qty: 30 CAPSULE | Refills: 11 | Status: SHIPPED | OUTPATIENT
Start: 2025-07-21

## 2025-07-21 NOTE — PROGRESS NOTES
[facility]  [address] [place] [address]  [phone]    Adam Cadena  : 1945     HPI   80 y.o., male returns in follow up for an elevated PSA.  PSA was 5.8 (29% free) on 2017; 5.0 on 2017; 5.5 on 10/20/18; 4.9 (35% free) on 19; 5.4 on 19; 5.2 on ; 6.0 on 6/15/21; 6.3 on 22; 6.1 on 23; 6.5 on 24 and is now 7.3 on 7/15/25.  Previously seen by Dr. Wheat.  No prior bx or FH of CaP.  Taking eliquis for a fib.  Reports stable nocturia 1-2x per night but reports mild leakage throughout the day requiring brief.  PVR today is 115cc by ultrasound.  MRI abd on 25 showed hyperdense renal cysts and liver cysts.      Past Medical History:   Diagnosis Date    COVID-19 2021    Hyperlipidemia     Papillary thyroid carcinoma (HCC)     1.2 cm and 0.25 cm x 2 in the left lobe status post thyroidectomy without lymph node dissection 3/25/2015 (no RAJAN)    Paroxysmal atrial fibrillation (HCC)     Postsurgical hypothyroidism     Primary hyperparathyroidism     Status post parathyroidectomy 3/27/2015    Stage III chronic kidney disease (HCC)      Past Surgical History:   Procedure Laterality Date    ADENOIDECTOMY  Age 8    HEENT  2004    Mouth surgery    HERNIA REPAIR  1998    ORTHOPEDIC SURGERY  2018    right hand Duputren's contracture repair    PARATHYROIDECTOMY  2015    Dr. Michelle    THYROIDECTOMY Bilateral 2015    Dr. Michelle    TONSILLECTOMY  Age 4     Current Outpatient Medications   Medication Sig Dispense Refill    tamsulosin (FLOMAX) 0.4 MG capsule Take 1 capsule by mouth every evening 30 capsule 11    zinc sulfate (ZINCATE) 220 (50 Zn) MG capsule Take 1 capsule by mouth daily      levothyroxine (SYNTHROID) 112 MCG tablet Take 1 tablet by mouth every morning (before breakfast) 90 tablet 3    amiodarone (CORDARONE) 200 MG tablet Take 0.5 tablets by mouth daily      hydrOXYzine HCl (ATARAX) 10 MG tablet TAKE ONE TABLET BY MOUTH THREE